# Patient Record
Sex: MALE | Race: WHITE | Employment: PART TIME | URBAN - NONMETROPOLITAN AREA
[De-identification: names, ages, dates, MRNs, and addresses within clinical notes are randomized per-mention and may not be internally consistent; named-entity substitution may affect disease eponyms.]

---

## 2018-03-08 ENCOUNTER — OFFICE VISIT (OUTPATIENT)
Dept: OPTOMETRY | Age: 63
End: 2018-03-08
Payer: COMMERCIAL

## 2018-03-08 DIAGNOSIS — E11.9 NON-INSULIN TREATED TYPE 2 DIABETES MELLITUS (HCC): ICD-10-CM

## 2018-03-08 DIAGNOSIS — H52.03 HYPEROPIA OF BOTH EYES WITH ASTIGMATISM AND PRESBYOPIA: Primary | ICD-10-CM

## 2018-03-08 DIAGNOSIS — H52.4 HYPEROPIA OF BOTH EYES WITH ASTIGMATISM AND PRESBYOPIA: Primary | ICD-10-CM

## 2018-03-08 DIAGNOSIS — H52.203 HYPEROPIA OF BOTH EYES WITH ASTIGMATISM AND PRESBYOPIA: Primary | ICD-10-CM

## 2018-03-08 PROCEDURE — 92014 COMPRE OPH EXAM EST PT 1/>: CPT | Performed by: OPTOMETRIST

## 2018-03-08 RX ORDER — PHENYLEPHRINE HCL 2.5 %
1 DROPS OPHTHALMIC (EYE) ONCE
Status: COMPLETED | OUTPATIENT
Start: 2018-03-08 | End: 2018-03-08

## 2018-03-08 RX ORDER — TROPICAMIDE 10 MG/ML
1 SOLUTION/ DROPS OPHTHALMIC ONCE
Status: COMPLETED | OUTPATIENT
Start: 2018-03-08 | End: 2018-03-08

## 2018-03-08 RX ORDER — BENOXINATE HCL/FLUORESCEIN SOD 0.4%-0.25%
1 DROPS OPHTHALMIC (EYE) ONCE
Status: COMPLETED | OUTPATIENT
Start: 2018-03-08 | End: 2018-03-08

## 2018-03-08 RX ADMIN — Medication 1 DROP: at 10:25

## 2018-03-08 RX ADMIN — TROPICAMIDE 1 DROP: 10 SOLUTION/ DROPS OPHTHALMIC at 10:26

## 2018-03-08 ASSESSMENT — VISUAL ACUITY
OD_CC+: -1
METHOD: SNELLEN - LINEAR
OD_CC: 20/20 OU
OS_CC: 20/25
CORRECTION_TYPE: GLASSES

## 2018-03-08 ASSESSMENT — REFRACTION_WEARINGRX
OD_SPHERE: +0.50
OS_AXIS: 090
OD_ADD: +2.25
OS_ADD: +2.25
OS_SPHERE: +0.75
SPECS_TYPE: BIFOCAL
OD_CYLINDER: -0.25
OD_AXIS: 060
OS_CYLINDER: -0.50

## 2018-03-08 ASSESSMENT — REFRACTION_MANIFEST
OS_AXIS: 089
OD_CYLINDER: -0.50
OD_ADD: +2.50
OD_SPHERE: +1.00
OS_SPHERE: +1.25
OS_CYLINDER: -0.50
OS_ADD: +2.50
OD_AXIS: 060

## 2018-03-08 ASSESSMENT — TONOMETRY
OD_IOP_MMHG: 17
OS_IOP_MMHG: 19
IOP_METHOD: APPLANATION W FLURESS DROP

## 2018-03-08 ASSESSMENT — KERATOMETRY
OS_K1POWER_DIOPTERS: 44.00
OD_AXISANGLE2_DEGREES: 075
OD_K2POWER_DIOPTERS: 44.50
OS_AXISANGLE2_DEGREES: 084
OD_AXISANGLE_DEGREES: 165
OS_AXISANGLE_DEGREES: 174
OD_K1POWER_DIOPTERS: 44.00
OS_K2POWER_DIOPTERS: 44.75

## 2018-03-08 ASSESSMENT — SLIT LAMP EXAM - LIDS
COMMENTS: NORMAL
COMMENTS: NORMAL

## 2019-04-17 ENCOUNTER — OFFICE VISIT (OUTPATIENT)
Dept: OPTOMETRY | Age: 64
End: 2019-04-17
Payer: COMMERCIAL

## 2019-04-17 DIAGNOSIS — H52.4 HYPEROPIA OF BOTH EYES WITH ASTIGMATISM AND PRESBYOPIA: ICD-10-CM

## 2019-04-17 DIAGNOSIS — H52.203 HYPEROPIA OF BOTH EYES WITH ASTIGMATISM AND PRESBYOPIA: ICD-10-CM

## 2019-04-17 DIAGNOSIS — E11.9 NON-INSULIN DEPENDENT TYPE 2 DIABETES MELLITUS (HCC): Primary | ICD-10-CM

## 2019-04-17 DIAGNOSIS — H52.03 HYPEROPIA OF BOTH EYES WITH ASTIGMATISM AND PRESBYOPIA: ICD-10-CM

## 2019-04-17 PROCEDURE — 92014 COMPRE OPH EXAM EST PT 1/>: CPT | Performed by: OPTOMETRIST

## 2019-04-17 RX ORDER — LISINOPRIL 10 MG/1
TABLET ORAL
COMMUNITY
Start: 2019-02-13

## 2019-04-17 RX ORDER — SILDENAFIL CITRATE 20 MG/1
TABLET ORAL
COMMUNITY
Start: 2019-02-15

## 2019-04-17 RX ORDER — PHENYLEPHRINE HCL 2.5 %
1 DROPS OPHTHALMIC (EYE) ONCE
Status: COMPLETED | OUTPATIENT
Start: 2019-04-17 | End: 2019-04-17

## 2019-04-17 RX ORDER — LANCETS 30 GAUGE
EACH MISCELLANEOUS
COMMUNITY

## 2019-04-17 RX ORDER — TROPICAMIDE 10 MG/ML
1 SOLUTION/ DROPS OPHTHALMIC ONCE
Status: COMPLETED | OUTPATIENT
Start: 2019-04-17 | End: 2019-04-17

## 2019-04-17 RX ORDER — TEMAZEPAM 15 MG/1
15 CAPSULE ORAL
COMMUNITY
Start: 2019-04-01

## 2019-04-17 RX ADMIN — Medication 1 DROP: at 14:37

## 2019-04-17 RX ADMIN — TROPICAMIDE 1 DROP: 10 SOLUTION/ DROPS OPHTHALMIC at 14:37

## 2019-04-17 ASSESSMENT — REFRACTION_WEARINGRX
OD_ADD: +2.50
OD_AXIS: 060
OS_CYLINDER: -0.50
SPECS_TYPE: BIFOCAL
OD_CYLINDER: -0.50
OS_AXIS: 089
OS_ADD: +2.50
OS_SPHERE: +1.25
OD_SPHERE: +1.00

## 2019-04-17 ASSESSMENT — REFRACTION_MANIFEST
OD_ADD: +2.50
OS_ADD: +2.50
OS_CYLINDER: -0.75
OS_SPHERE: +1.50
OS_AXIS: 094
OD_AXIS: 075
OD_CYLINDER: -0.75
OD_SPHERE: +1.25

## 2019-04-17 ASSESSMENT — VISUAL ACUITY
OD_CC: 20/20 OU
METHOD: SNELLEN - LINEAR
OD_CC+: -2
CORRECTION_TYPE: GLASSES
OS_CC: 20/30
OS_CC+: -1

## 2019-04-17 ASSESSMENT — TONOMETRY
OS_IOP_MMHG: 18
OD_IOP_MMHG: 15
IOP_METHOD: NON-CONTACT AIR PUFF

## 2019-04-17 ASSESSMENT — SLIT LAMP EXAM - LIDS
COMMENTS: NORMAL
COMMENTS: NORMAL

## 2019-04-17 NOTE — PROGRESS NOTES
Chico Hargrove presents today for   Chief Complaint   Patient presents with    Blurred Vision    Vision Exam   .    HPI     Blurred Vision     In both eyes. Vision is blurred. Context:  distance vision, reading and driving. Comments     Last Vision Exam: 3/3/2018 AW  Last Ophthalmology Exam: n/a  Last Filled Glasses Rx: 3/3/2018  Insurance: [facility]  Update: DM Exam; Glasses  Only wears glasses when he is reading or driving  He feels like his vision has become more blurry. Diabetic:  Sugars: does not have to check regularly. HmgA1C:  7.5  2/15/2019                   Current Outpatient Medications   Medication Sig Dispense Refill    temazepam (RESTORIL) 15 MG capsule Take 15 mg by mouth.  lisinopril (PRINIVIL;ZESTRIL) 10 MG tablet       Lancets MISC       sildenafil (REVATIO) 20 MG tablet 1-2 tabs as needed.  metFORMIN (GLUCOPHAGE) 500 MG tablet Take 500 mg by mouth 2 times daily (with meals).  simvastatin (ZOCOR) 10 MG tablet Take 10 mg by mouth nightly.  predniSONE (DELTASONE) 20 MG tablet 1 bid for 5 days, 1 qd for 5 days 15 tablet 0     No current facility-administered medications for this visit.           Family History   Problem Relation Age of Onset    Arthritis Mother     Heart Disease Mother     Cataracts Mother     Diabetes Father     Heart Disease Father     Cancer Brother         bone    Glaucoma Neg Hx      Social History     Socioeconomic History    Marital status:      Spouse name: None    Number of children: None    Years of education: None    Highest education level: None   Occupational History    None   Social Needs    Financial resource strain: None    Food insecurity:     Worry: None     Inability: None    Transportation needs:     Medical: None     Non-medical: None   Tobacco Use    Smoking status: Never Smoker    Smokeless tobacco: Never Used   Substance and Sexual Activity    Alcohol use: None    Drug use: None    Sexual activity: None   Lifestyle    Physical activity:     Days per week: None     Minutes per session: None    Stress: None   Relationships    Social connections:     Talks on phone: None     Gets together: None     Attends Protestant service: None     Active member of club or organization: None     Attends meetings of clubs or organizations: None     Relationship status: None    Intimate partner violence:     Fear of current or ex partner: None     Emotionally abused: None     Physically abused: None     Forced sexual activity: None   Other Topics Concern    None   Social History Narrative    None       Past Medical History:   Diagnosis Date    Bronchial asthma     Chronic insomnia     Hyperlipidemia     IBS (irritable bowel syndrome)     Impaired fasting glucose     Onychomycosis of left great toe     toenail clear    Panic attacks     Renal calculi     Sleep apnea syndrome     Urticaria          Main Ophthalmology Exam     External Exam       Right Left    External Normal Normal          Slit Lamp Exam       Right Left    Lids/Lashes Normal Normal    Conjunctiva/Sclera White and quiet White and quiet    Cornea Clear Clear    Anterior Chamber Deep and quiet Deep and quiet    Iris Round and reactive Round and reactive    Lens Clear Clear    Vitreous Normal Central vitreous floaters          Fundus Exam       Right Left    Disc Normal Normal    C/D Ratio 0.3 0.25    Macula Normal Normal    Vessels Normal Normal    Periphery Normal Normal                  Tonometry     Tonometry (Non-contact air puff, 2:25 PM)       Right Left    Pressure 15 18   IOP.2             15.7  CH:  10.4          8.6  WS: 7.9          4.1                   Visual Acuity (Snellen - Linear)       Right Left    Dist cc 20/30 -2 20/30 -1    Near cc 20/20 OU     Correction:  Glasses         Not recorded        Pupils     Pupils       Pupils    Right PERRL    Left PERRL               Not recorded         Not recorded          Ophthalmology

## 2020-01-28 ENCOUNTER — OFFICE VISIT (OUTPATIENT)
Dept: OPTOMETRY | Age: 65
End: 2020-01-28
Payer: COMMERCIAL

## 2020-01-28 PROCEDURE — 99213 OFFICE O/P EST LOW 20 MIN: CPT | Performed by: OPTOMETRIST

## 2020-01-28 PROCEDURE — 92250 FUNDUS PHOTOGRAPHY W/I&R: CPT | Performed by: OPTOMETRIST

## 2020-01-28 RX ORDER — TROPICAMIDE 10 MG/ML
1 SOLUTION/ DROPS OPHTHALMIC ONCE
Status: COMPLETED | OUTPATIENT
Start: 2020-01-28 | End: 2020-01-28

## 2020-01-28 RX ADMIN — TROPICAMIDE 1 DROP: 10 SOLUTION/ DROPS OPHTHALMIC at 16:25

## 2020-01-28 ASSESSMENT — TONOMETRY
IOP_METHOD: NON-CONTACT AIR PUFF
OD_IOP_MMHG: 19
OS_IOP_MMHG: 20

## 2020-01-28 ASSESSMENT — SLIT LAMP EXAM - LIDS
COMMENTS: NORMAL
COMMENTS: NORMAL

## 2020-01-28 ASSESSMENT — VISUAL ACUITY
CORRECTION_TYPE: GLASSES
OD_CC+: -1
METHOD: SNELLEN - LINEAR
OS_CC: 20/20
OS_CC+: -1

## 2020-01-28 NOTE — PROGRESS NOTES
Mood/Affect:  Normal                     1. Vitreous degeneration of right eye    2. Vitreous floaters of both eyes           Patient Instructions   No tx;   If any flood of floaters, or curtain over vision      Return in about 1 year (around 1/28/2021) for complete eye exam.

## 2020-06-09 ENCOUNTER — OFFICE VISIT (OUTPATIENT)
Dept: OPTOMETRY | Age: 65
End: 2020-06-09
Payer: COMMERCIAL

## 2020-06-09 PROCEDURE — 92014 COMPRE OPH EXAM EST PT 1/>: CPT | Performed by: OPTOMETRIST

## 2020-06-09 ASSESSMENT — VISUAL ACUITY
METHOD: SNELLEN - LINEAR
OD_CC+: -2
CORRECTION_TYPE: GLASSES
OS_CC+: -1
OS_CC: 20/50
OD_CC: 20/30 OU

## 2020-06-09 ASSESSMENT — SLIT LAMP EXAM - LIDS
COMMENTS: NORMAL
COMMENTS: NORMAL

## 2020-06-09 ASSESSMENT — REFRACTION_WEARINGRX
OD_CYLINDER: -0.75
OD_ADD: +2.50
OS_SPHERE: +1.50
OD_AXIS: 075
SPECS_TYPE: BIFOCAL
OS_AXIS: 094
OS_CYLINDER: -0.75
OS_ADD: +2.50
OD_SPHERE: +1.25

## 2020-06-09 ASSESSMENT — REFRACTION_MANIFEST
OD_SPHERE: +1.50
OS_CYLINDER: -0.75
OD_CYLINDER: -0.75
OS_AXIS: 099
OD_AXIS: 076
OS_SPHERE: +1.75

## 2020-06-09 ASSESSMENT — TONOMETRY
IOP_METHOD: NON-CONTACT AIR PUFF
OS_IOP_MMHG: 21
OD_IOP_MMHG: 17

## 2020-06-09 NOTE — PROGRESS NOTES
Neuro/Psych     Neuro/Psych     Oriented x3:  Yes    Mood/Affect:  Normal               Not recorded            Ophthalmology Exam     Wearing Rx       Sphere Cylinder Axis Add    Right +1.25 -0.75 075 +2.50    Left +1.50 -0.75 094 +2.50    Age:  1yr    Type:  Bifocal              Manifest Refraction     Manifest Refraction       Sphere Cylinder Axis Dist VA Add    Right +1.50 -0.75 076 20/25 +2.50    Left +1.75 -0.75 099 20/25 +2.50          Manifest Refraction #2 (Auto)       Sphere Cylinder Axis Dist VA Add    Right +1.75 -0.75 076      Left +1.75 -0.75 099                 Final Rx       Sphere Cylinder Axis Add    Right +1.50 -0.75 076 +2.50    Left +1.75 -0.75 099 +2.50    Type:  Bifocal    Expiration Date:  6/10/2022      Final Rx #2       Sphere Cylinder Axis Add    Right +1.25 -0.75 076 +2.75    Left +1.50 -0.75 097 +2.75    Type:  Bifocal    Expiration Date:  8/13/2022        Rx #2 was made [facility]    1. Hyperopia of both eyes with astigmatism and presbyopia    2.  Nuclear sclerosis of both eyes           Patient Instructions   New glasses recommended;  Keep yearly appointments because of diabetes   Return if any flashes or flood of floaters or curtain over the vision      Return in about 1 year (around 6/9/2021) for complete eye exam.

## 2020-08-12 ASSESSMENT — REFRACTION_MANIFEST
OS_ADD: +2.50
OD_ADD: +2.50

## 2021-10-23 ENCOUNTER — OFFICE VISIT (OUTPATIENT)
Dept: PRIMARY CARE CLINIC | Age: 66
End: 2021-10-23
Payer: MEDICARE

## 2021-10-23 VITALS
HEART RATE: 89 BPM | SYSTOLIC BLOOD PRESSURE: 140 MMHG | TEMPERATURE: 96.4 F | WEIGHT: 145 LBS | DIASTOLIC BLOOD PRESSURE: 78 MMHG | OXYGEN SATURATION: 97 % | BODY MASS INDEX: 22.76 KG/M2 | HEIGHT: 67 IN

## 2021-10-23 DIAGNOSIS — I10 ELEVATED BLOOD PRESSURE READING WITH DIAGNOSIS OF HYPERTENSION: Primary | ICD-10-CM

## 2021-10-23 PROCEDURE — 99213 OFFICE O/P EST LOW 20 MIN: CPT | Performed by: FAMILY MEDICINE

## 2021-10-23 PROCEDURE — 99213 OFFICE O/P EST LOW 20 MIN: CPT

## 2021-10-23 RX ORDER — SIMVASTATIN 40 MG
TABLET ORAL
COMMUNITY
Start: 2021-09-04 | End: 2021-10-23

## 2021-10-23 SDOH — ECONOMIC STABILITY: FOOD INSECURITY: WITHIN THE PAST 12 MONTHS, THE FOOD YOU BOUGHT JUST DIDN'T LAST AND YOU DIDN'T HAVE MONEY TO GET MORE.: NEVER TRUE

## 2021-10-23 SDOH — ECONOMIC STABILITY: FOOD INSECURITY: WITHIN THE PAST 12 MONTHS, YOU WORRIED THAT YOUR FOOD WOULD RUN OUT BEFORE YOU GOT MONEY TO BUY MORE.: NEVER TRUE

## 2021-10-23 ASSESSMENT — ENCOUNTER SYMPTOMS: SHORTNESS OF BREATH: 0

## 2021-10-23 ASSESSMENT — PATIENT HEALTH QUESTIONNAIRE - PHQ9
1. LITTLE INTEREST OR PLEASURE IN DOING THINGS: 0
SUM OF ALL RESPONSES TO PHQ QUESTIONS 1-9: 0
SUM OF ALL RESPONSES TO PHQ QUESTIONS 1-9: 0
2. FEELING DOWN, DEPRESSED OR HOPELESS: 0
SUM OF ALL RESPONSES TO PHQ QUESTIONS 1-9: 0
SUM OF ALL RESPONSES TO PHQ9 QUESTIONS 1 & 2: 0

## 2021-10-23 ASSESSMENT — SOCIAL DETERMINANTS OF HEALTH (SDOH): HOW HARD IS IT FOR YOU TO PAY FOR THE VERY BASICS LIKE FOOD, HOUSING, MEDICAL CARE, AND HEATING?: NOT HARD AT ALL

## 2021-10-23 NOTE — PROGRESS NOTES
[address]  [address], [address] [place]  [phone]      Ryan Wallace is a 77 y.o. male who is c/o of Hypertension (220/173 at home. Also dizzy and nauseous, some chest tightness and headache)      HPI:     HPI  Pt started feeling dizziness approx 1.5-2 hours ago - came on suddenly  Felt nauseous and had mild HA (frontal)  Took Meclizine, as he thought it might be vertigo, but it did not help  Also took ASA 81 mg    220/173 on home cuff (wrist)  Cuff is a couple years old; had not used it for his BP in the past 6 months, but wife has been checking her BP with it  Tested on pt's wife and her BP was normal    States his BP is always 120/80 at appt's; last appt with PCP was 10/8/21 and BP was 127/72    HA is better but not gone  Dizziness is improved    Takes his medications at night, other than Metformin TID  Taking Lisinopril 10 mg daily consistently    Did have high-dose flu vaccine 4 days ago and Covid booster on 10/8 at appt with PCP. Did have some sx's of not feeling well for 1/2 day after his flu vaccine. Subjective:      Past Medical History:   Diagnosis Date    Bronchial asthma     Chronic insomnia     Hyperlipidemia     IBS (irritable bowel syndrome)     Impaired fasting glucose     Onychomycosis of left great toe     toenail clear    Panic attacks     Renal calculi     Sleep apnea syndrome     Urticaria       Past Surgical History:   Procedure Laterality Date    KNEE SURGERY      LITHOTRIPSY         Social History     Tobacco Use    Smoking status: Never Smoker    Smokeless tobacco: Never Used   Substance Use Topics    Alcohol use: Not on file      Current Outpatient Medications   Medication Sig Dispense Refill    empagliflozin (JARDIANCE) 10 MG tablet Take 10 mg by mouth daily      temazepam (RESTORIL) 15 MG capsule Take 15 mg by mouth.       lisinopril (PRINIVIL;ZESTRIL) 10 MG tablet       Lancets MISC       sildenafil (REVATIO) 20 MG tablet 1-2 tabs as needed.  metFORMIN (GLUCOPHAGE) 500 MG tablet Take 500 mg by mouth 3 times daily       simvastatin (ZOCOR) 10 MG tablet Take 10 mg by mouth nightly. No current facility-administered medications for this visit. Allergies   Allergen Reactions    Biaxin [Clarithromycin]     Tamsulosin      Other reaction(s): Dizziness    Zyban [Bupropion]        Review of Systems   Eyes: Negative for visual disturbance. Respiratory: Negative for shortness of breath. Cardiovascular: Negative for chest pain and palpitations. Neurological: Positive for dizziness and headaches. Negative for speech difficulty and numbness. Objective:     Vitals:    10/23/21 1517 10/23/21 1522 10/23/21 1618   BP: (!) 160/88 (!) 140/80 (!) 140/78   Site: Right Upper Arm Right Upper Arm Right Upper Arm   Position: Sitting Sitting Sitting   Cuff Size: Medium Adult Medium Adult Medium Adult   Pulse: 89     Temp: 96.4 °F (35.8 °C)     TempSrc: Tympanic     SpO2: 97%     Weight: 145 lb (65.8 kg)     Height: 5' 7\" (1.702 m)       Physical Exam  Vitals and nursing note reviewed. Constitutional:       General: He is not in acute distress. Appearance: He is well-developed. HENT:      Head: Normocephalic and atraumatic. Right Ear: Tympanic membrane, ear canal and external ear normal.      Left Ear: Tympanic membrane, ear canal and external ear normal.      Nose: Nose normal.      Mouth/Throat:      Mouth: Mucous membranes are moist.      Pharynx: Oropharynx is clear. No oropharyngeal exudate. Eyes:      Conjunctiva/sclera: Conjunctivae normal.   Cardiovascular:      Rate and Rhythm: Normal rate and regular rhythm. Heart sounds: Normal heart sounds. Pulmonary:      Effort: Pulmonary effort is normal. No respiratory distress. Breath sounds: Normal breath sounds. Abdominal:      General: Bowel sounds are normal. There is no distension. Palpations: Abdomen is soft. Tenderness: There is no abdominal tenderness. Skin:     General: Skin is warm and dry. Neurological:      Mental Status: He is alert and oriented to person, place, and time. Assessment:       Diagnosis Orders   1. Elevated blood pressure reading with diagnosis of hypertension         Plan:      Return if symptoms worsen or fail to improve. No orders of the defined types were placed in this encounter. No orders of the defined types were placed in this encounter. Patient given educational materials - see patient instructions. Discussed use, benefit, and side effects of prescribed medications. All patient questions answered. Pt voiced understanding.        Electronically signed by Vic Montanez DO, DO on 10/31/2021 at 11:59 PM

## 2022-07-20 ENCOUNTER — OFFICE VISIT (OUTPATIENT)
Dept: OPTOMETRY | Age: 67
End: 2022-07-20
Payer: MEDICARE

## 2022-07-20 DIAGNOSIS — H52.203 HYPEROPIA OF BOTH EYES WITH ASTIGMATISM AND PRESBYOPIA: Primary | ICD-10-CM

## 2022-07-20 DIAGNOSIS — H52.4 HYPEROPIA OF BOTH EYES WITH ASTIGMATISM AND PRESBYOPIA: Primary | ICD-10-CM

## 2022-07-20 DIAGNOSIS — H52.03 HYPEROPIA OF BOTH EYES WITH ASTIGMATISM AND PRESBYOPIA: Primary | ICD-10-CM

## 2022-07-20 DIAGNOSIS — H25.813 COMBINED FORMS OF AGE-RELATED CATARACT OF BOTH EYES: ICD-10-CM

## 2022-07-20 DIAGNOSIS — E11.9 NON-INSULIN DEPENDENT TYPE 2 DIABETES MELLITUS (HCC): ICD-10-CM

## 2022-07-20 PROCEDURE — 92250 FUNDUS PHOTOGRAPHY W/I&R: CPT | Performed by: OPTOMETRIST

## 2022-07-20 PROCEDURE — 92015 DETERMINE REFRACTIVE STATE: CPT | Performed by: OPTOMETRIST

## 2022-07-20 PROCEDURE — 92014 COMPRE OPH EXAM EST PT 1/>: CPT | Performed by: OPTOMETRIST

## 2022-07-20 RX ORDER — TROPICAMIDE 10 MG/ML
1 SOLUTION/ DROPS OPHTHALMIC ONCE
Status: COMPLETED | OUTPATIENT
Start: 2022-07-20 | End: 2022-07-20

## 2022-07-20 RX ADMIN — TROPICAMIDE 1 DROP: 10 SOLUTION/ DROPS OPHTHALMIC at 10:58

## 2022-07-20 ASSESSMENT — KERATOMETRY
OD_AXISANGLE2_DEGREES: 087
OS_K2POWER_DIOPTERS: 44.75
OD_K1POWER_DIOPTERS: 44.00
OS_AXISANGLE2_DEGREES: 095
OD_K2POWER_DIOPTERS: 44.50
METHOD_AUTO_MANUAL: AUTOMATED
OD_AXISANGLE_DEGREES: 177
OS_K1POWER_DIOPTERS: 43.75
OS_AXISANGLE_DEGREES: 005

## 2022-07-20 ASSESSMENT — REFRACTION_MANIFEST
OS_CYLINDER: -1.00
OD_CYLINDER: -1.00
OD_AXIS: 076
OS_AXIS: 099
OD_SPHERE: +2.00
OS_SPHERE: +2.00

## 2022-07-20 ASSESSMENT — TONOMETRY
OS_IOP_MMHG: 18
IOP_METHOD: NON-CONTACT AIR PUFF
OD_IOP_MMHG: 16

## 2022-07-20 ASSESSMENT — SLIT LAMP EXAM - LIDS
COMMENTS: NORMAL
COMMENTS: NORMAL

## 2022-07-20 ASSESSMENT — VISUAL ACUITY
METHOD_MR: 20/25-2OU
OS_CC: 20/40
CORRECTION_TYPE: GLASSES
OD_CC+: -2
OS_CC+: -2

## 2022-07-20 NOTE — PROGRESS NOTES
Tara Christie presents today for   Chief Complaint   Patient presents with    Ophth Diabetic Exam   .    HPI    Last Vision Exam: 6/09/20  Last Ophthalmology Exam: n/a  Last Filled Glasses Rx: 6/09/20  Insurance: Sahil Joshi     Update: DM Exam, update glasses - vision changes distance and near  Diabetic:  Sugars: hasnt checked lately   HmgA1C: 6.6 July           Current Outpatient Medications   Medication Sig Dispense Refill    empagliflozin (JARDIANCE) 10 MG tablet Take 10 mg by mouth daily      temazepam (RESTORIL) 15 MG capsule Take 15 mg by mouth.      lisinopril (PRINIVIL;ZESTRIL) 10 MG tablet       Lancets MISC       sildenafil (REVATIO) 20 MG tablet 1-2 tabs as needed. metFORMIN (GLUCOPHAGE) 500 MG tablet Take 500 mg by mouth 3 times daily       simvastatin (ZOCOR) 10 MG tablet Take 10 mg by mouth nightly. No current facility-administered medications for this visit.          Family History   Problem Relation Age of Onset    Arthritis Mother     Heart Disease Mother     Cataracts Mother     Diabetes Father     Heart Disease Father     Cancer Brother         bone    Glaucoma Neg Hx      Social History     Socioeconomic History    Marital status:      Spouse name: None    Number of children: None    Years of education: None    Highest education level: None   Tobacco Use    Smoking status: Never    Smokeless tobacco: Never     Social Determinants of Health     Financial Resource Strain: Low Risk     Difficulty of Paying Living Expenses: Not hard at all   Food Insecurity: No Food Insecurity    Worried About Running Out of Food in the Last Year: Never true    Ran Out of Food in the Last Year: Never true       Past Medical History:   Diagnosis Date    Bronchial asthma     Chronic insomnia     Hyperlipidemia     IBS (irritable bowel syndrome)     Impaired fasting glucose     Onychomycosis of left great toe     toenail clear    Panic attacks     Renal calculi     Sleep apnea syndrome     Urticaria Main Ophthalmology Exam       External Exam         Right Left    External Normal Normal              Slit Lamp Exam         Right Left    Lids/Lashes Normal Normal    Conjunctiva/Sclera White and quiet White and quiet    Cornea Clear Clear    Anterior Chamber Deep and quiet Deep and quiet    Iris Round and reactive Round and reactive    Lens 1/2-1+ Nuclear sclerosis 1/2-1+ Nuclear sclerosis    Vitreous Posterior vitreous detachment Posterior vitreous detachment              Fundus Exam         Right Left    Disc Normal Normal    C/D Ratio 0.3 0.25    Macula Normal Normal    Vessels Normal Normal                   <div id=\"[email or web address]\"></div>     Tonometry       Tonometry (Non-contact air puff, 10:41 AM)         Right Left    Pressure 16 18      Right / Left  IOPg 14.9 / 15.6  CH 9.9 / 8.3  WS 7.1 / 4.3                       [facility])         Right Left    Dist cc 20/50 -2 20/40 -2      Correction: Glasses          Keratometry       Keratometry (Automated)         K1 Axis K2 Axis    Right 44.00 087 44.50 177    Left 43.75 095 44.75 005                  Pupils       Pupils         Pupils    Right PERRL    Left PERRL                  Not recorded       Not recorded         Ophthalmology Exam       Wearing Rx         Sphere Cylinder Axis Add    Right +1.50 -0.75 076 +2.75    Left +1.75 -0.75 099 +2.75      Type: Bifocal              Wearing Rx #2         Sphere Cylinder Axis Add    Right +1.25 -0.75 076 +2.75    Left +1.50 -0.75 097 +2.75      Age: wearing remake     Type: Bifocal                    Manifest Refraction       Manifest Refraction         Sphere Cylinder Axis Dist VA Add    Right +2.00 -1.00 076 20/30 +2.50    Left +2.00 -1.00 099 20/30+ +2.50              Manifest Refraction #2 (Auto)         Sphere Cylinder Axis Dist VA Add    Right +2.00 -0.75 086      Left +2.00 -1.00 098                Manifest Refraction Comments    20/25-2ou                  Final Rx Sphere Cylinder Axis Add    Right +2.00 -1.00 076 +2.75    Left +2.00 -1.00 099 +2.75      Type: PAL    Expiration Date: 7/20/2024          Final Rx #2         Sphere Cylinder Axis Add    Right +1.50 -0.75 076 +2.75    Left +1.75 -1.00 097 +2.75      Type: PAL-REMAKE    Expiration Date: 7/20/2024            Neuro/Psych       Neuro/Psych       Oriented x3: Yes    Mood/Affect: Normal                    Orders Placed This Encounter   Procedures     DIABETES EYE EXAM    Color Fundus Photography-OU-Both Eyes       IMPRESSION:  1. Hyperopia of both eyes with astigmatism and presbyopia    2. Non-insulin dependent type 2 diabetes mellitus ([address]    3. Combined forms of age-related cataract of both eyes        PLAN:    New glasses recommended;  discussed changing to progressive lens and the adaption period required  Discussed the patient's diagnosis of diabetes and the impact this can have on their ocular health, potentially even leading to permanent blindness. I discussed with the patient the importance of continued follow-up and management with their primary care physician to control their glycemic, blood pressure, and lipid levels. The patient verbalized understanding. 3. Monitor for future progression and visual significance. Counseled patient that more glare may be noticed and more light may be needed for reading. Glycemic control as per PCP   There are no Patient Instructions on file for this visit.    Return in about 1 year (around 7/20/2023) for complete eye exam.

## 2022-07-28 ENCOUNTER — TELEPHONE (OUTPATIENT)
Dept: OPTOMETRY | Age: 67
End: 2022-07-28

## 2022-07-28 ASSESSMENT — REFRACTION_WEARINGRX
OD_SPHERE: +1.50
SPECS_TYPE: BIFOCAL
OS_ADD: +2.75
OD_AXIS: 076
OD_ADD: +2.75
OS_SPHERE: +1.75
OS_AXIS: 099
OS_CYLINDER: -0.75
OD_CYLINDER: -0.75

## 2022-07-28 ASSESSMENT — REFRACTION_MANIFEST
OS_ADD: +2.50
OD_ADD: +2.50